# Patient Record
Sex: MALE | Race: WHITE | Employment: UNEMPLOYED | URBAN - METROPOLITAN AREA
[De-identification: names, ages, dates, MRNs, and addresses within clinical notes are randomized per-mention and may not be internally consistent; named-entity substitution may affect disease eponyms.]

---
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
## 2021-05-15 ENCOUNTER — APPOINTMENT (OUTPATIENT)
Dept: CT IMAGING | Age: 21
DRG: 816 | End: 2021-05-15
Payer: MEDICAID

## 2021-05-15 ENCOUNTER — APPOINTMENT (OUTPATIENT)
Dept: GENERAL RADIOLOGY | Age: 21
DRG: 816 | End: 2021-05-15
Payer: MEDICAID

## 2021-05-15 ENCOUNTER — HOSPITAL ENCOUNTER (INPATIENT)
Age: 21
LOS: 1 days | Discharge: ANOTHER HOSPITAL | DRG: 816 | End: 2021-05-17
Attending: EMERGENCY MEDICINE | Admitting: STUDENT IN AN ORGANIZED HEALTH CARE EDUCATION/TRAINING PROGRAM
Payer: MEDICAID

## 2021-05-15 DIAGNOSIS — G93.40 ENCEPHALOPATHY: Primary | ICD-10-CM

## 2021-05-15 DIAGNOSIS — F12.90 MARIJUANA USE: ICD-10-CM

## 2021-05-15 DIAGNOSIS — R11.10 NON-INTRACTABLE VOMITING, PRESENCE OF NAUSEA NOT SPECIFIED, UNSPECIFIED VOMITING TYPE: ICD-10-CM

## 2021-05-15 DIAGNOSIS — F10.921 ACUTE ALCOHOLIC INTOXICATION WITH DELIRIUM (HCC): ICD-10-CM

## 2021-05-15 DIAGNOSIS — T68.XXXA HYPOTHERMIA, INITIAL ENCOUNTER: ICD-10-CM

## 2021-05-15 LAB
ALBUMIN SERPL-MCNC: 4.8 GM/DL (ref 3.4–5)
ALCOHOL SCREEN SERUM: 0.37 %WT/VOL
ALP BLD-CCNC: 96 IU/L (ref 40–129)
ALT SERPL-CCNC: 15 U/L (ref 10–40)
AMPHETAMINES: NEGATIVE
ANION GAP SERPL CALCULATED.3IONS-SCNC: 12 MMOL/L (ref 4–16)
APTT: 21.9 SECONDS (ref 25.1–37.1)
AST SERPL-CCNC: 14 IU/L (ref 15–37)
BARBITURATE SCREEN URINE: NEGATIVE
BASE EXCESS: 4 (ref 0–3.3)
BASOPHILS ABSOLUTE: 0.1 K/CU MM
BASOPHILS RELATIVE PERCENT: 0.5 % (ref 0–1)
BENZODIAZEPINE SCREEN, URINE: NEGATIVE
BILIRUB SERPL-MCNC: 0.3 MG/DL (ref 0–1)
BILIRUBIN DIRECT: 0.2 MG/DL (ref 0–0.3)
BILIRUBIN, INDIRECT: 0.1 MG/DL (ref 0–0.7)
BUN BLDV-MCNC: 15 MG/DL (ref 6–23)
CALCIUM SERPL-MCNC: 9.4 MG/DL (ref 8.3–10.6)
CANNABINOID SCREEN URINE: ABNORMAL
CHLORIDE BLD-SCNC: 111 MMOL/L (ref 99–110)
CO2: 25 MMOL/L (ref 21–32)
COCAINE METABOLITE: NEGATIVE
COMMENT: ABNORMAL
CREAT SERPL-MCNC: 1.3 MG/DL (ref 0.9–1.3)
DIFFERENTIAL TYPE: ABNORMAL
EOSINOPHILS ABSOLUTE: 0.3 K/CU MM
EOSINOPHILS RELATIVE PERCENT: 2.5 % (ref 0–3)
GFR AFRICAN AMERICAN: >60 ML/MIN/1.73M2
GFR NON-AFRICAN AMERICAN: >60 ML/MIN/1.73M2
GLUCOSE BLD-MCNC: 136 MG/DL (ref 70–99)
GLUCOSE BLD-MCNC: 163 MG/DL (ref 70–99)
HCO3 VENOUS: 24.2 MMOL/L (ref 19–25)
HCT VFR BLD CALC: 47.3 % (ref 42–52)
HEMOGLOBIN: 16.2 GM/DL (ref 13.5–18)
IMMATURE NEUTROPHIL %: 1.6 % (ref 0–0.43)
INR BLD: 0.99 INDEX
LIPASE: 15 IU/L (ref 13–60)
LYMPHOCYTES ABSOLUTE: 2.5 K/CU MM
LYMPHOCYTES RELATIVE PERCENT: 18.5 % (ref 24–44)
MCH RBC QN AUTO: 30.6 PG (ref 27–31)
MCHC RBC AUTO-ENTMCNC: 34.2 % (ref 32–36)
MCV RBC AUTO: 89.4 FL (ref 78–100)
MONOCYTES ABSOLUTE: 0.7 K/CU MM
MONOCYTES RELATIVE PERCENT: 5.5 % (ref 0–4)
NUCLEATED RBC %: 0 %
O2 SAT, VEN: 64.4 % (ref 50–70)
OPIATES, URINE: NEGATIVE
OXYCODONE: NEGATIVE
PCO2, VEN: 54 MMHG (ref 38–52)
PDW BLD-RTO: 12.6 % (ref 11.7–14.9)
PH VENOUS: 7.26 (ref 7.32–7.42)
PHENCYCLIDINE, URINE: NEGATIVE
PLATELET # BLD: 238 K/CU MM (ref 140–440)
PMV BLD AUTO: 11.2 FL (ref 7.5–11.1)
PO2, VEN: 39 MMHG (ref 28–48)
POTASSIUM SERPL-SCNC: 3.4 MMOL/L (ref 3.5–5.1)
PROTHROMBIN TIME: 12 SECONDS (ref 11.7–14.5)
RBC # BLD: 5.29 M/CU MM (ref 4.6–6.2)
SEGMENTED NEUTROPHILS ABSOLUTE COUNT: 9.4 K/CU MM
SEGMENTED NEUTROPHILS RELATIVE PERCENT: 71.4 % (ref 36–66)
SODIUM BLD-SCNC: 148 MMOL/L (ref 135–145)
TOTAL IMMATURE NEUTOROPHIL: 0.21 K/CU MM
TOTAL NUCLEATED RBC: 0 K/CU MM
TOTAL PROTEIN: 7.2 GM/DL (ref 6.4–8.2)
WBC # BLD: 13.2 K/CU MM (ref 4–10.5)

## 2021-05-15 PROCEDURE — 2700000000 HC OXYGEN THERAPY PER DAY

## 2021-05-15 PROCEDURE — 5A1945Z RESPIRATORY VENTILATION, 24-96 CONSECUTIVE HOURS: ICD-10-PCS | Performed by: EMERGENCY MEDICINE

## 2021-05-15 PROCEDURE — 72125 CT NECK SPINE W/O DYE: CPT

## 2021-05-15 PROCEDURE — 2500000003 HC RX 250 WO HCPCS

## 2021-05-15 PROCEDURE — 99285 EMERGENCY DEPT VISIT HI MDM: CPT

## 2021-05-15 PROCEDURE — 82248 BILIRUBIN DIRECT: CPT

## 2021-05-15 PROCEDURE — 85730 THROMBOPLASTIN TIME PARTIAL: CPT

## 2021-05-15 PROCEDURE — 71045 X-RAY EXAM CHEST 1 VIEW: CPT

## 2021-05-15 PROCEDURE — 0BH18EZ INSERTION OF ENDOTRACHEAL AIRWAY INTO TRACHEA, VIA NATURAL OR ARTIFICIAL OPENING ENDOSCOPIC: ICD-10-PCS | Performed by: EMERGENCY MEDICINE

## 2021-05-15 PROCEDURE — 85610 PROTHROMBIN TIME: CPT

## 2021-05-15 PROCEDURE — 31500 INSERT EMERGENCY AIRWAY: CPT

## 2021-05-15 PROCEDURE — 83690 ASSAY OF LIPASE: CPT

## 2021-05-15 PROCEDURE — 96374 THER/PROPH/DIAG INJ IV PUSH: CPT

## 2021-05-15 PROCEDURE — 2500000003 HC RX 250 WO HCPCS: Performed by: EMERGENCY MEDICINE

## 2021-05-15 PROCEDURE — 74018 RADEX ABDOMEN 1 VIEW: CPT

## 2021-05-15 PROCEDURE — 82962 GLUCOSE BLOOD TEST: CPT

## 2021-05-15 PROCEDURE — 96375 TX/PRO/DX INJ NEW DRUG ADDON: CPT

## 2021-05-15 PROCEDURE — 80307 DRUG TEST PRSMV CHEM ANLYZR: CPT

## 2021-05-15 PROCEDURE — 80053 COMPREHEN METABOLIC PANEL: CPT

## 2021-05-15 PROCEDURE — 82805 BLOOD GASES W/O2 SATURATION: CPT

## 2021-05-15 PROCEDURE — 93005 ELECTROCARDIOGRAM TRACING: CPT | Performed by: EMERGENCY MEDICINE

## 2021-05-15 PROCEDURE — 85025 COMPLETE CBC W/AUTO DIFF WBC: CPT

## 2021-05-15 PROCEDURE — G0480 DRUG TEST DEF 1-7 CLASSES: HCPCS

## 2021-05-15 PROCEDURE — 70450 CT HEAD/BRAIN W/O DYE: CPT

## 2021-05-15 RX ORDER — ROCURONIUM BROMIDE 10 MG/ML
INJECTION, SOLUTION INTRAVENOUS DAILY PRN
Status: DISCONTINUED | OUTPATIENT
Start: 2021-05-15 | End: 2021-05-17 | Stop reason: HOSPADM

## 2021-05-15 RX ORDER — ETOMIDATE 2 MG/ML
INJECTION INTRAVENOUS DAILY PRN
Status: DISCONTINUED | OUTPATIENT
Start: 2021-05-15 | End: 2021-05-17 | Stop reason: HOSPADM

## 2021-05-15 RX ADMIN — ETOMIDATE 20 MG: 2 INJECTION, SOLUTION INTRAVENOUS at 23:04

## 2021-05-15 RX ADMIN — ROCURONIUM BROMIDE 100 MG: 10 SOLUTION INTRAVENOUS at 23:04

## 2021-05-15 ASSESSMENT — PULMONARY FUNCTION TESTS: PIF_VALUE: 25

## 2021-05-16 ENCOUNTER — APPOINTMENT (OUTPATIENT)
Dept: GENERAL RADIOLOGY | Age: 21
DRG: 816 | End: 2021-05-16
Payer: MEDICAID

## 2021-05-16 PROBLEM — T51.0X1A: Status: ACTIVE | Noted: 2021-05-16

## 2021-05-16 LAB
ANION GAP SERPL CALCULATED.3IONS-SCNC: 12 MMOL/L (ref 4–16)
BASE EXCESS: 4 (ref 0–3.3)
BASE EXCESS: 4 (ref 0–3.3)
BUN BLDV-MCNC: 12 MG/DL (ref 6–23)
CALCIUM SERPL-MCNC: 8 MG/DL (ref 8.3–10.6)
CARBON MONOXIDE, BLOOD: 1.4 % (ref 0–5)
CARBON MONOXIDE, BLOOD: 1.6 % (ref 0–5)
CHLORIDE BLD-SCNC: 110 MMOL/L (ref 99–110)
CO2 CONTENT: 23.6 MMOL/L (ref 19–24)
CO2 CONTENT: 24.1 MMOL/L (ref 19–24)
CO2: 22 MMOL/L (ref 21–32)
COMMENT: ABNORMAL
COMMENT: ABNORMAL
CREAT SERPL-MCNC: 1 MG/DL (ref 0.9–1.3)
EKG ATRIAL RATE: 40 BPM
EKG DIAGNOSIS: NORMAL
EKG P AXIS: 62 DEGREES
EKG P-R INTERVAL: 164 MS
EKG Q-T INTERVAL: 478 MS
EKG QRS DURATION: 104 MS
EKG QTC CALCULATION (BAZETT): 389 MS
EKG R AXIS: 5 DEGREES
EKG T AXIS: 31 DEGREES
EKG VENTRICULAR RATE: 40 BPM
GFR AFRICAN AMERICAN: >60 ML/MIN/1.73M2
GFR NON-AFRICAN AMERICAN: >60 ML/MIN/1.73M2
GLUCOSE BLD-MCNC: 118 MG/DL (ref 70–99)
HCO3 ARTERIAL: 22.2 MMOL/L (ref 18–23)
HCO3 ARTERIAL: 22.6 MMOL/L (ref 18–23)
MAGNESIUM: 1.8 MG/DL (ref 1.8–2.4)
METHEMOGLOBIN ARTERIAL: 1.3 %
METHEMOGLOBIN ARTERIAL: 1.5 %
O2 SATURATION: 95.5 % (ref 96–97)
O2 SATURATION: 95.8 % (ref 96–97)
PCO2 ARTERIAL: 44 MMHG (ref 32–45)
PCO2 ARTERIAL: 48 MMHG (ref 32–45)
PH BLOOD: 7.28 (ref 7.34–7.45)
PH BLOOD: 7.31 (ref 7.34–7.45)
PHOSPHORUS: 4.2 MG/DL (ref 2.5–4.9)
PO2 ARTERIAL: 140 MMHG (ref 75–100)
PO2 ARTERIAL: 96 MMHG (ref 75–100)
POTASSIUM SERPL-SCNC: 3.8 MMOL/L (ref 3.5–5.1)
SODIUM BLD-SCNC: 144 MMOL/L (ref 135–145)
TROPONIN T: <0.01 NG/ML
TROPONIN T: <0.01 NG/ML

## 2021-05-16 PROCEDURE — 36600 WITHDRAWAL OF ARTERIAL BLOOD: CPT

## 2021-05-16 PROCEDURE — 2500000003 HC RX 250 WO HCPCS: Performed by: INTERNAL MEDICINE

## 2021-05-16 PROCEDURE — 94761 N-INVAS EAR/PLS OXIMETRY MLT: CPT

## 2021-05-16 PROCEDURE — 6370000000 HC RX 637 (ALT 250 FOR IP): Performed by: INTERNAL MEDICINE

## 2021-05-16 PROCEDURE — 6360000002 HC RX W HCPCS: Performed by: STUDENT IN AN ORGANIZED HEALTH CARE EDUCATION/TRAINING PROGRAM

## 2021-05-16 PROCEDURE — 80048 BASIC METABOLIC PNL TOTAL CA: CPT

## 2021-05-16 PROCEDURE — 2500000003 HC RX 250 WO HCPCS: Performed by: EMERGENCY MEDICINE

## 2021-05-16 PROCEDURE — 94002 VENT MGMT INPAT INIT DAY: CPT

## 2021-05-16 PROCEDURE — 2500000003 HC RX 250 WO HCPCS: Performed by: STUDENT IN AN ORGANIZED HEALTH CARE EDUCATION/TRAINING PROGRAM

## 2021-05-16 PROCEDURE — 94640 AIRWAY INHALATION TREATMENT: CPT

## 2021-05-16 PROCEDURE — 84484 ASSAY OF TROPONIN QUANT: CPT

## 2021-05-16 PROCEDURE — 89220 SPUTUM SPECIMEN COLLECTION: CPT

## 2021-05-16 PROCEDURE — 2000000000 HC ICU R&B

## 2021-05-16 PROCEDURE — 82803 BLOOD GASES ANY COMBINATION: CPT

## 2021-05-16 PROCEDURE — 2700000000 HC OXYGEN THERAPY PER DAY

## 2021-05-16 PROCEDURE — 76937 US GUIDE VASCULAR ACCESS: CPT

## 2021-05-16 PROCEDURE — 6360000002 HC RX W HCPCS: Performed by: EMERGENCY MEDICINE

## 2021-05-16 PROCEDURE — C1751 CATH, INF, PER/CENT/MIDLINE: HCPCS

## 2021-05-16 PROCEDURE — 02HV33Z INSERTION OF INFUSION DEVICE INTO SUPERIOR VENA CAVA, PERCUTANEOUS APPROACH: ICD-10-PCS | Performed by: STUDENT IN AN ORGANIZED HEALTH CARE EDUCATION/TRAINING PROGRAM

## 2021-05-16 PROCEDURE — 99223 1ST HOSP IP/OBS HIGH 75: CPT | Performed by: INTERNAL MEDICINE

## 2021-05-16 PROCEDURE — 36569 INSJ PICC 5 YR+ W/O IMAGING: CPT

## 2021-05-16 PROCEDURE — 71045 X-RAY EXAM CHEST 1 VIEW: CPT

## 2021-05-16 PROCEDURE — 93010 ELECTROCARDIOGRAM REPORT: CPT | Performed by: INTERNAL MEDICINE

## 2021-05-16 PROCEDURE — 2580000003 HC RX 258: Performed by: EMERGENCY MEDICINE

## 2021-05-16 PROCEDURE — 83735 ASSAY OF MAGNESIUM: CPT

## 2021-05-16 PROCEDURE — 84100 ASSAY OF PHOSPHORUS: CPT

## 2021-05-16 PROCEDURE — 2580000003 HC RX 258: Performed by: STUDENT IN AN ORGANIZED HEALTH CARE EDUCATION/TRAINING PROGRAM

## 2021-05-16 RX ORDER — SODIUM CHLORIDE, SODIUM LACTATE, POTASSIUM CHLORIDE, CALCIUM CHLORIDE 600; 310; 30; 20 MG/100ML; MG/100ML; MG/100ML; MG/100ML
INJECTION, SOLUTION INTRAVENOUS CONTINUOUS
Status: DISCONTINUED | OUTPATIENT
Start: 2021-05-16 | End: 2021-05-17 | Stop reason: HOSPADM

## 2021-05-16 RX ORDER — PROPOFOL 10 MG/ML
5-50 INJECTION, EMULSION INTRAVENOUS
Status: DISCONTINUED | OUTPATIENT
Start: 2021-05-16 | End: 2021-05-17 | Stop reason: HOSPADM

## 2021-05-16 RX ORDER — LORAZEPAM 1 MG/1
1 TABLET ORAL
Status: DISCONTINUED | OUTPATIENT
Start: 2021-05-16 | End: 2021-05-17 | Stop reason: HOSPADM

## 2021-05-16 RX ORDER — LORAZEPAM 1 MG/1
2 TABLET ORAL
Status: DISCONTINUED | OUTPATIENT
Start: 2021-05-16 | End: 2021-05-17 | Stop reason: HOSPADM

## 2021-05-16 RX ORDER — PROMETHAZINE HYDROCHLORIDE 25 MG/1
12.5 TABLET ORAL EVERY 6 HOURS PRN
Status: DISCONTINUED | OUTPATIENT
Start: 2021-05-16 | End: 2021-05-17 | Stop reason: HOSPADM

## 2021-05-16 RX ORDER — LORAZEPAM 2 MG/ML
3 INJECTION INTRAMUSCULAR
Status: DISCONTINUED | OUTPATIENT
Start: 2021-05-16 | End: 2021-05-17 | Stop reason: HOSPADM

## 2021-05-16 RX ORDER — ALBUTEROL SULFATE 90 UG/1
4 AEROSOL, METERED RESPIRATORY (INHALATION) EVERY 4 HOURS
Status: DISCONTINUED | OUTPATIENT
Start: 2021-05-16 | End: 2021-05-17 | Stop reason: HOSPADM

## 2021-05-16 RX ORDER — KETAMINE HYDROCHLORIDE 10 MG/ML
100 INJECTION, SOLUTION INTRAMUSCULAR; INTRAVENOUS ONCE
Status: COMPLETED | OUTPATIENT
Start: 2021-05-16 | End: 2021-05-16

## 2021-05-16 RX ORDER — SODIUM CHLORIDE 0.9 % (FLUSH) 0.9 %
5-40 SYRINGE (ML) INJECTION PRN
Status: DISCONTINUED | OUTPATIENT
Start: 2021-05-16 | End: 2021-05-17 | Stop reason: HOSPADM

## 2021-05-16 RX ORDER — LORAZEPAM 2 MG/ML
1 INJECTION INTRAMUSCULAR
Status: DISCONTINUED | OUTPATIENT
Start: 2021-05-16 | End: 2021-05-17 | Stop reason: HOSPADM

## 2021-05-16 RX ORDER — LORAZEPAM 2 MG/ML
2 INJECTION INTRAMUSCULAR
Status: DISCONTINUED | OUTPATIENT
Start: 2021-05-16 | End: 2021-05-17 | Stop reason: HOSPADM

## 2021-05-16 RX ORDER — SODIUM CHLORIDE 9 MG/ML
25 INJECTION, SOLUTION INTRAVENOUS PRN
Status: DISCONTINUED | OUTPATIENT
Start: 2021-05-16 | End: 2021-05-17 | Stop reason: HOSPADM

## 2021-05-16 RX ORDER — MIDAZOLAM HYDROCHLORIDE 2 MG/2ML
4 INJECTION, SOLUTION INTRAMUSCULAR; INTRAVENOUS EVERY 4 HOURS PRN
Status: DISCONTINUED | OUTPATIENT
Start: 2021-05-16 | End: 2021-05-17 | Stop reason: HOSPADM

## 2021-05-16 RX ORDER — LORAZEPAM 2 MG/ML
4 INJECTION INTRAMUSCULAR
Status: DISCONTINUED | OUTPATIENT
Start: 2021-05-16 | End: 2021-05-17 | Stop reason: HOSPADM

## 2021-05-16 RX ORDER — SODIUM CHLORIDE 0.9 % (FLUSH) 0.9 %
5-40 SYRINGE (ML) INJECTION EVERY 12 HOURS SCHEDULED
Status: DISCONTINUED | OUTPATIENT
Start: 2021-05-16 | End: 2021-05-17 | Stop reason: HOSPADM

## 2021-05-16 RX ORDER — LORAZEPAM 1 MG/1
3 TABLET ORAL
Status: DISCONTINUED | OUTPATIENT
Start: 2021-05-16 | End: 2021-05-17 | Stop reason: HOSPADM

## 2021-05-16 RX ORDER — POLYETHYLENE GLYCOL 3350 17 G/17G
17 POWDER, FOR SOLUTION ORAL DAILY PRN
Status: DISCONTINUED | OUTPATIENT
Start: 2021-05-16 | End: 2021-05-17 | Stop reason: HOSPADM

## 2021-05-16 RX ORDER — LORAZEPAM 1 MG/1
4 TABLET ORAL
Status: DISCONTINUED | OUTPATIENT
Start: 2021-05-16 | End: 2021-05-17 | Stop reason: HOSPADM

## 2021-05-16 RX ORDER — 0.9 % SODIUM CHLORIDE 0.9 %
1000 INTRAVENOUS SOLUTION INTRAVENOUS ONCE
Status: COMPLETED | OUTPATIENT
Start: 2021-05-16 | End: 2021-05-16

## 2021-05-16 RX ORDER — THIAMINE HYDROCHLORIDE 100 MG/ML
100 INJECTION, SOLUTION INTRAMUSCULAR; INTRAVENOUS DAILY
Status: DISCONTINUED | OUTPATIENT
Start: 2021-05-16 | End: 2021-05-17 | Stop reason: HOSPADM

## 2021-05-16 RX ORDER — ACETAMINOPHEN 325 MG/1
650 TABLET ORAL EVERY 6 HOURS PRN
Status: DISCONTINUED | OUTPATIENT
Start: 2021-05-16 | End: 2021-05-17 | Stop reason: HOSPADM

## 2021-05-16 RX ORDER — ACETAMINOPHEN 650 MG/1
650 SUPPOSITORY RECTAL EVERY 6 HOURS PRN
Status: DISCONTINUED | OUTPATIENT
Start: 2021-05-16 | End: 2021-05-17 | Stop reason: HOSPADM

## 2021-05-16 RX ORDER — CHLORHEXIDINE GLUCONATE 0.12 MG/ML
15 RINSE ORAL 2 TIMES DAILY
Status: DISCONTINUED | OUTPATIENT
Start: 2021-05-16 | End: 2021-05-17 | Stop reason: HOSPADM

## 2021-05-16 RX ORDER — POTASSIUM CHLORIDE 7.45 MG/ML
10 INJECTION INTRAVENOUS PRN
Status: DISCONTINUED | OUTPATIENT
Start: 2021-05-16 | End: 2021-05-17 | Stop reason: HOSPADM

## 2021-05-16 RX ADMIN — PROPOFOL 40 MCG/KG/MIN: 10 INJECTION, EMULSION INTRAVENOUS at 04:54

## 2021-05-16 RX ADMIN — ENOXAPARIN SODIUM 40 MG: 40 INJECTION SUBCUTANEOUS at 08:23

## 2021-05-16 RX ADMIN — PROPOFOL 65 MCG/KG/MIN: 10 INJECTION, EMULSION INTRAVENOUS at 17:13

## 2021-05-16 RX ADMIN — CHLORHEXIDINE GLUCONATE 0.12% ORAL RINSE 15 ML: 1.2 LIQUID ORAL at 08:26

## 2021-05-16 RX ADMIN — FAMOTIDINE 20 MG: 10 INJECTION, SOLUTION INTRAVENOUS at 21:04

## 2021-05-16 RX ADMIN — LIDOCAINE HYDROCHLORIDE 5 ML: 10 INJECTION, SOLUTION EPIDURAL; INFILTRATION; INTRACAUDAL; PERINEURAL at 10:55

## 2021-05-16 RX ADMIN — PROPOFOL 80 MCG/KG/MIN: 10 INJECTION, EMULSION INTRAVENOUS at 21:11

## 2021-05-16 RX ADMIN — Medication 4 PUFF: at 15:18

## 2021-05-16 RX ADMIN — PROPOFOL 65 MCG/KG/MIN: 10 INJECTION, EMULSION INTRAVENOUS at 14:41

## 2021-05-16 RX ADMIN — SODIUM CHLORIDE 1000 ML: 9 INJECTION, SOLUTION INTRAVENOUS at 01:15

## 2021-05-16 RX ADMIN — FAMOTIDINE 20 MG: 10 INJECTION, SOLUTION INTRAVENOUS at 03:00

## 2021-05-16 RX ADMIN — FAMOTIDINE 20 MG: 10 INJECTION, SOLUTION INTRAVENOUS at 08:26

## 2021-05-16 RX ADMIN — PROPOFOL 80 MCG/KG/MIN: 10 INJECTION, EMULSION INTRAVENOUS at 23:20

## 2021-05-16 RX ADMIN — KETAMINE HYDROCHLORIDE 100 MG: 10 INJECTION INTRAMUSCULAR; INTRAVENOUS at 00:32

## 2021-05-16 RX ADMIN — SODIUM CHLORIDE, POTASSIUM CHLORIDE, SODIUM LACTATE AND CALCIUM CHLORIDE: 600; 310; 30; 20 INJECTION, SOLUTION INTRAVENOUS at 02:45

## 2021-05-16 RX ADMIN — Medication 2 MCG/MIN: at 04:06

## 2021-05-16 RX ADMIN — SODIUM CHLORIDE, POTASSIUM CHLORIDE, SODIUM LACTATE AND CALCIUM CHLORIDE: 600; 310; 30; 20 INJECTION, SOLUTION INTRAVENOUS at 11:24

## 2021-05-16 RX ADMIN — PROPOFOL 10 MCG/KG/MIN: 10 INJECTION, EMULSION INTRAVENOUS at 00:08

## 2021-05-16 RX ADMIN — PROPOFOL 65 MCG/KG/MIN: 10 INJECTION, EMULSION INTRAVENOUS at 07:19

## 2021-05-16 RX ADMIN — LORAZEPAM 4 MG: 2 INJECTION INTRAMUSCULAR; INTRAVENOUS at 22:06

## 2021-05-16 RX ADMIN — Medication 50 MCG/HR: at 18:26

## 2021-05-16 RX ADMIN — Medication 4 PUFF: at 19:44

## 2021-05-16 RX ADMIN — CHLORHEXIDINE GLUCONATE 0.12% ORAL RINSE 15 ML: 1.2 LIQUID ORAL at 21:05

## 2021-05-16 RX ADMIN — SODIUM CHLORIDE, PRESERVATIVE FREE 10 ML: 5 INJECTION INTRAVENOUS at 21:05

## 2021-05-16 RX ADMIN — PROPOFOL 65 MCG/KG/MIN: 10 INJECTION, EMULSION INTRAVENOUS at 11:24

## 2021-05-16 RX ADMIN — SODIUM CHLORIDE, PRESERVATIVE FREE 10 ML: 5 INJECTION INTRAVENOUS at 08:24

## 2021-05-16 RX ADMIN — SODIUM CHLORIDE, POTASSIUM CHLORIDE, SODIUM LACTATE AND CALCIUM CHLORIDE: 600; 310; 30; 20 INJECTION, SOLUTION INTRAVENOUS at 19:22

## 2021-05-16 RX ADMIN — PROPOFOL 65 MCG/KG/MIN: 10 INJECTION, EMULSION INTRAVENOUS at 19:22

## 2021-05-16 RX ADMIN — KETAMINE HYDROCHLORIDE 100 MG: 10 INJECTION INTRAMUSCULAR; INTRAVENOUS at 01:49

## 2021-05-16 RX ADMIN — Medication 25 MCG/HR: at 05:50

## 2021-05-16 RX ADMIN — THIAMINE HYDROCHLORIDE 100 MG: 100 INJECTION, SOLUTION INTRAMUSCULAR; INTRAVENOUS at 08:23

## 2021-05-16 RX ADMIN — ALBUTEROL SULFATE 4 PUFF: 90 AEROSOL, METERED RESPIRATORY (INHALATION) at 19:44

## 2021-05-16 RX ADMIN — ALBUTEROL SULFATE 4 PUFF: 90 AEROSOL, METERED RESPIRATORY (INHALATION) at 15:17

## 2021-05-16 ASSESSMENT — PULMONARY FUNCTION TESTS
PIF_VALUE: 20
PIF_VALUE: 18
PIF_VALUE: 19
PIF_VALUE: 18
PIF_VALUE: 19
PIF_VALUE: 19
PIF_VALUE: 18
PIF_VALUE: 19

## 2021-05-16 ASSESSMENT — PAIN SCALES - GENERAL: PAINLEVEL_OUTOF10: 0

## 2021-05-16 NOTE — ED NOTES
Resp at bedside, RSI available verbal order for Dr. Parrish Parker 20mg etomidate, 100mg  Rustam     Shira Eugene, [address] [place] [address]  05/15/21 6892

## 2021-05-16 NOTE — ED NOTES
Pt unresponsiveness, Dr. Dania Anderson to bedside, plan to intubate to maintain airway.       Laly Pedersen RN  05/15/21 7096

## 2021-05-16 NOTE — PROGRESS NOTES
Pt intubated by  with out complication and placed on vent with initial settings AC RR 12  50% o2 PEEP 5. Will monitor/.
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
## 2021-05-16 NOTE — ED NOTES
[facility]  05/15/21 4890

## 2021-05-16 NOTE — ED NOTES
Report called to Jasmin Schroeder RN for bed 2121     Courtney Costello, [facility]  05/16/21 9745

## 2021-05-16 NOTE — ED NOTES
Dr. Manjinder Zepeda notified of temp 84.6 verbal order for bear hugger warming. Attempted to locate bear hugger machine without success. House supervisor and ICU were called to try and locate after searching ED.      Courtney Costello RN  05/16/21 5080
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
## 2021-05-16 NOTE — ED NOTES
Pt bradycardic episode HR 36-44, Dr Hu Cristina notified. EKG completed.              Kali Zhou RN  05/15/21 8459

## 2021-05-16 NOTE — CONSULTS
Consult completed. .Education regarding insertion of PICC reviewed with patient's father. Risk/benefit/and alternatives discussed. verbalized understanding. Consent given by Phong Hager, patient's father . Time out done at 0921. PICC line inserted right upper arm  without difficulty per protocol. Pt tolerated well. Good blood return and flushes easily. Doppler and/or ECG tip confirmation system utilized for tip placement with P wave changes noted by RN during insertion and ECG strip/Doppler signature left on chart. Educational booklet left in chart.  PICC ok to use, bedside RN notified of same   Hannah Wilson RN
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
## 2021-05-16 NOTE — PROGRESS NOTES
05/16/21 0218   Vent Information   $Ventilation $Initial Day   Skin Assessment Clean, dry, & intact   Suction Catheter Diameter 14   Vent Type 980   Vent Mode AC/VC   Vt Ordered 500 mL   Rate Set 12 bmp   Peak Flow 55 L/min   Pressure Support 0 cmH20   FiO2  30 %   SpO2 100 %   SpO2/FiO2 ratio 333.33   Sensitivity 3   PEEP/CPAP 5   I Time/ I Time % 0 s   Humidification Source HME   Vent Patient Data   High Peep/I Pressure 0   Peak Inspiratory Pressure 18 cmH2O   Mean Airway Pressure 7.5 cmH20   Rate Measured 12 br/min   Vt Exhaled 505 mL   Minute Volume 6.04 Liters   I:E Ratio 1:4   Plateau Pressure 13 UBD46   Static Compliance 59 mL/cmH2O   Cough/Sputum   Sputum How Obtained Endotracheal;Suctioned   $Obtained Sample $Induced Sputum   Cough Non-productive   Sputum Amount None   Sputum Color None   Tenacity None   Spontaneous Breathing Trial (SBT) RT Doc   Pulse 86   Additional Respiratory  Assessments   Resp 14   Alarm Settings   High Pressure Alarm 40 cmH2O   Delay Alarm 20 sec(s)   Low Minute Volume Alarm 2.5 L/min   Apnea (secs) 20 secs   High Respiratory Rate 40 br/min   Low Exhaled Vt  250 mL   Non-Surgical Airway Endo Tracheal Tube   Placement Date/Time: 05/15/21 7803   Timeout: Procedure; Patient;Site/Side;Appropriate Equipment; Consent Confirmed;Site prepped with chlorhexidine;Correct Position  Mask Ventilation: Ventilated by mask (1)  Placed By: In ED  Inserted by: Dr. Jasmyn Vazquez. ..    Secured at 26 cm   Measured From [facility] By [employer] tube kaba   Site Condition Dry   Cuff Pressure   (Minimal leak)   Pt intubated and brought from the ED

## 2021-05-16 NOTE — CARE COORDINATION
Pt is currently intubated and sedated. CM placed a list of addiction support options in the pt's discharge instructions. CM will continue to follow and be available during admission.     NAEL Thurston

## 2021-05-16 NOTE — CONSULTS
Pulmonary Consult Note      Reason for Consult: vent management  Requesting Physician: Dr. Willow Sanchez  Subjective:   CHIEF COMPLAINT :    Patient Active Problem List    Diagnosis Date Noted    [facility] 05/16/2021        HPI:                The patient is a 21 y.o. male with significant past medical history of Asthma  presents with complaints of suspected Alcohol overdose and ? Poisoning. His Alcohol level was 0.37. He was at his friend house drinking 2 5th of liquor in 1 to 2 hours. He was found lying on the bed. He was intubated and brought to the ICU. At this time he is sedated with minimal response to the painful stimuli. José Luis recently had a break up with his GF about 2 weeks ago. Past Medical History:      Diagnosis Date    Asthma       Past Surgical History:    History reviewed. No pertinent surgical history. Current Medications:     sodium chloride flush  5-40 mL Intravenous 2 times per day    enoxaparin  40 mg Subcutaneous Daily    thiamine  100 mg Intravenous Daily    sodium chloride flush  5-40 mL Intravenous 2 times per day    albuterol sulfate HFA  4 puff Inhalation Q4H    And    ipratropium  4 puff Inhalation Q4H    chlorhexidine  15 mL Mouth/Throat BID    famotidine (PEPCID) injection  20 mg Intravenous BID     Allergies:    Social History:    TOBACCO:   reports that he has never smoked. He does not have any smokeless tobacco history on file. ETOH:   reports no history of alcohol use. Patient currently lives independently    Family History:   History reviewed. No pertinent family history. REVIEW OF SYSTEMS:    CONSTITUTIONAL:  negative for fevers, chills, diaphoresis, activity change, appetite change, fatigue, night sweats and unexpected weight change.    EYES:  negative for blurred vision, eye discharge, visual disturbance and icterus  HEENT:  negative for hearing loss, tinnitus, ear drainage, sinus pressure, nasal congestion, epistaxis time. No obvious depression or anxiety. MUSCULOSKELETAL: No obvious misalignment or effusion of the joints. No clubbing, cyanosis of the digits. SKIN:  normal skin color, texture, turgor and no redness, warmth, or swelling. No palpable nodules    DATA:    Old records have been reviewed  CBC with Differential:    Lab Results   Component Value Date    WBC 13.2 05/15/2021    RBC 5.29 05/15/2021    HGB 16.2 05/15/2021    HCT 47.3 05/15/2021     05/15/2021    MCV 89.4 05/15/2021    MCH 30.6 05/15/2021    MCHC 34.2 05/15/2021    RDW 12.6 05/15/2021    SEGSPCT 71.4 05/15/2021    LYMPHOPCT 18.5 05/15/2021    MONOPCT 5.5 05/15/2021    BASOPCT 0.5 05/15/2021    MONOSABS 0.7 05/15/2021    LYMPHSABS 2.5 05/15/2021    EOSABS 0.3 05/15/2021    BASOSABS 0.1 05/15/2021    DIFFTYPE AUTOMATED DIFFERENTIAL 05/15/2021     BMP:    Lab Results   Component Value Date     05/16/2021    K 3.8 05/16/2021     05/16/2021    CO2 22 05/16/2021    BUN 12 05/16/2021    CREATININE 1.0 05/16/2021    CALCIUM 8.0 05/16/2021    GFRAA >60 05/16/2021    LABGLOM >60 05/16/2021    GLUCOSE 118 05/16/2021     Hepatic Function Panel:    Lab Results   Component Value Date    ALKPHOS 96 05/15/2021    ALT 15 05/15/2021    AST 14 05/15/2021    PROT 7.2 05/15/2021    BILITOT 0.3 05/15/2021    BILIDIR 0.2 05/15/2021    IBILI 0.1 05/15/2021     ABG:    Lab Results   Component Value Date    JXE6WNX 22.2 05/16/2021    NEV4HCD 44.0 05/16/2021    PO2ART 96 05/16/2021       Cultures:   Pending      Radiology Review:      No acute cardiopulmonary process    Assessment/Plan       Patient Active Problem List    Diagnosis Date Noted    Accidental ETOH poisoning, initial encounter ([facility] 05/16/2021     Alcohol ? Overdose  Cannabinoids positive  Hypomagnesemia  Acute hypoxic resp failure  VDRF    PLAN  1. Watch for withdrawal symptoms  2. Propofol  3. Thiamine  4. Folic acid  5. MVT  6. Ativan prn  7. Inhalers  8. Keep sats > 92%  9.  DVT and GI Prophylaxis  10. C/w present management  11. CXR in am  15.  SAT and SBT trial when stable      Electronically signed by Rufino Colorado MD on 5/16/2021 at 12:09 PM

## 2021-05-16 NOTE — PROGRESS NOTES
Attempting to draw labs at this time pt sat up in bed began kicking and fighting vent, HR went up to 140 O2 dropped to low 80's. Increased Propofol pr MAR and started fentanyl gtt pr MAR order.

## 2021-05-16 NOTE — PROGRESS NOTES
30-year-old male admitted for alcohol intoxication.   Patient was found unresponsive, currently intubated  History of alcohol abuse    Labs reviewed  Continue present management, supportive management  Watch out for alcohol withdrawal  Pulmonology on board for vent management

## 2021-05-17 ENCOUNTER — APPOINTMENT (OUTPATIENT)
Dept: GENERAL RADIOLOGY | Age: 21
DRG: 816 | End: 2021-05-17
Payer: MEDICAID

## 2021-05-17 VITALS
OXYGEN SATURATION: 95 % | SYSTOLIC BLOOD PRESSURE: 122 MMHG | HEIGHT: 68 IN | WEIGHT: 230 LBS | DIASTOLIC BLOOD PRESSURE: 76 MMHG | RESPIRATION RATE: 16 BRPM | HEART RATE: 93 BPM | TEMPERATURE: 98.2 F | BODY MASS INDEX: 34.86 KG/M2

## 2021-05-17 PROCEDURE — 94640 AIRWAY INHALATION TREATMENT: CPT

## 2021-05-17 PROCEDURE — 2580000003 HC RX 258: Performed by: INTERNAL MEDICINE

## 2021-05-17 PROCEDURE — 2580000003 HC RX 258: Performed by: NURSE PRACTITIONER

## 2021-05-17 PROCEDURE — 6360000002 HC RX W HCPCS: Performed by: NURSE PRACTITIONER

## 2021-05-17 PROCEDURE — 2500000003 HC RX 250 WO HCPCS: Performed by: INTERNAL MEDICINE

## 2021-05-17 PROCEDURE — 6360000002 HC RX W HCPCS: Performed by: INTERNAL MEDICINE

## 2021-05-17 PROCEDURE — 6360000002 HC RX W HCPCS: Performed by: STUDENT IN AN ORGANIZED HEALTH CARE EDUCATION/TRAINING PROGRAM

## 2021-05-17 RX ORDER — LORAZEPAM 2 MG/ML
4 INJECTION INTRAMUSCULAR ONCE
Status: COMPLETED | OUTPATIENT
Start: 2021-05-17 | End: 2021-05-17

## 2021-05-17 RX ADMIN — Medication 4 PUFF: at 00:56

## 2021-05-17 RX ADMIN — ALBUTEROL SULFATE 4 PUFF: 90 AEROSOL, METERED RESPIRATORY (INHALATION) at 00:55

## 2021-05-17 RX ADMIN — LORAZEPAM 4 MG: 2 INJECTION INTRAMUSCULAR; INTRAVENOUS at 00:30

## 2021-05-17 RX ADMIN — LORAZEPAM 4 MG: 2 INJECTION INTRAMUSCULAR; INTRAVENOUS at 01:07

## 2021-05-17 RX ADMIN — LEVETIRACETAM 1500 MG: 100 INJECTION, SOLUTION INTRAVENOUS at 00:57

## 2021-05-17 RX ADMIN — PROPOFOL 80 MCG/KG/MIN: 10 INJECTION, EMULSION INTRAVENOUS at 01:45

## 2021-05-17 RX ADMIN — MIDAZOLAM 1 MG/HR: 5 INJECTION INTRAMUSCULAR; INTRAVENOUS at 01:53

## 2021-05-17 ASSESSMENT — PULMONARY FUNCTION TESTS: PIF_VALUE: 19

## 2021-05-17 NOTE — PROGRESS NOTES
Pt currently maxed on propofol, fentanyl gtt at 125, contacted Dr Ana María Mccarty d/t pt sitting up in bed trying to fight restraints, gagging on ETT, HR elevation. Will try CIWA 4mg IV ativan if this does not help, verbal order for versed push placed.

## 2021-05-17 NOTE — PROGRESS NOTES
Report called to Aakash Blake at [facility], also called [facility] and gave him all the information for transfer. MICU ETA 40 min.

## 2021-05-17 NOTE — PLAN OF CARE
Problem: Non-Violent Restraints  Goal: Removal from restraints as soon as assessed to be safe  Outcome: Ongoing  Goal: No harm/injury to patient while restraints in use  Outcome: Ongoing  Goal: Patient's dignity will be maintained  Outcome: Ongoing     Problem: Falls - Risk of:  Goal: Will remain free from falls  Description: Will remain free from falls  Outcome: Ongoing  Goal: Absence of physical injury  Description: Absence of physical injury  Outcome: Ongoing     Problem: Skin Integrity:  Goal: Will show no infection signs and symptoms  Description: Will show no infection signs and symptoms  Outcome: Ongoing  Goal: Absence of new skin breakdown  Description: Absence of new skin breakdown  Outcome: Ongoing     Problem: Airway Clearance - Ineffective:  Goal: Ability to maintain a clear airway will improve  Description: Ability to maintain a clear airway will improve  Outcome: Ongoing

## 2021-05-17 NOTE — PLAN OF CARE
Patient witnessed by nurse to have 3 seizures, describes as clonic tonic, was given 8 mg IV Ativan, patient was already intubated, and vent dependent. Patient was maxed out on propofol, however was started on Versed drip as patient continued to have seizures. Neurology was consulted who recommended giving Keppra, and transferring to a facility that has continuous EEG.   Patient was transferred to [place].

## 2021-05-17 NOTE — PLAN OF CARE
Problem: Non-Violent Restraints  Goal: Removal from restraints as soon as assessed to be safe  5/17/2021 0326 by Lisa Cavazos RN  Outcome: Completed  5/16/2021 2145 by Lisa Cavazos RN  Outcome: Ongoing  Goal: No harm/injury to patient while restraints in use  5/17/2021 0326 by Lisa Cavazos RN  Outcome: Completed  5/16/2021 2145 by Lisa Cavazos RN  Outcome: Ongoing  Goal: Patient's dignity will be maintained  5/17/2021 0326 by Lisa Cavazos RN  Outcome: Completed  5/16/2021 2145 by Lisa Cavazos RN  Outcome: Ongoing     Problem: Falls - Risk of:  Goal: Will remain free from falls  Description: Will remain free from falls  5/17/2021 0326 by Lisa Cavazos RN  Outcome: Completed  5/16/2021 2145 by Lisa Cavazos RN  Outcome: Ongoing  Goal: Absence of physical injury  Description: Absence of physical injury  5/17/2021 0326 by Lisa Cavazos RN  Outcome: Completed  5/16/2021 2145 by Lisa Cavazos RN  Outcome: Ongoing     Problem: Skin Integrity:  Goal: Will show no infection signs and symptoms  Description: Will show no infection signs and symptoms  5/17/2021 0326 by Lisa Cavazos RN  Outcome: Completed  5/16/2021 2145 by Lisa Cavazos RN  Outcome: Ongoing  Goal: Absence of new skin breakdown  Description: Absence of new skin breakdown  5/17/2021 0326 by Lisa Cavazos RN  Outcome: Completed  5/16/2021 2145 by Lisa Cavazos RN  Outcome: Ongoing     Problem: Airway Clearance - Ineffective:  Goal: Ability to maintain a clear airway will improve  Description: Ability to maintain a clear airway will improve  5/17/2021 0326 by Lisa Cavazos RN  Outcome: Completed  5/16/2021 2145 by Lisa Cavazos RN  Outcome: Ongoing

## 2021-05-17 NOTE — DISCHARGE SUMMARY
Instructions [facility]:[id number]    Printed on:05/17/21 6388   Medication Information                      hydrocortisone 2.5 % cream  Apply topically 2 times daily. naproxen (NAPROSYN) 500 MG tablet  Take 1 tablet by mouth 2 times daily             predniSONE (DELTASONE) 10 MG tablet  5 tablets PO Qday X 3, then 4 tablets PO Qday X 3, then 3 tablets PO Qday X 3, then 2 tablets PO Qday X 3, then 1 tablets PO Qday X 3, then stop. BMP/CBC  Recent Labs     05/15/21  2300 05/16/21  0250   * 144   K 3.4* 3.8   * 110   CO2 25 22   BUN 15 12   CREATININE 1.3 1.0   WBC 13.2*  --    HCT 47.3  --      --        IMAGING:  XR ABDOMEN (KUB) (SINGLE AP VIEW)    Result Date: 5/16/2021  EXAMINATION: ONE SUPINE XRAY VIEW(S) OF THE ABDOMEN 5/15/2021 11:13 pm COMPARISON: None. HISTORY: ORDERING SYSTEM PROVIDED HISTORY: post OG placement TECHNOLOGIST PROVIDED HISTORY: Reason for exam:->post OG placement Reason for Exam: post OG placement Acuity: Acute Type of Exam: Initial FINDINGS: Tip of the enteric tube is seen within the fundus of the stomach. The side hole is noted within the fundus of the stomach. There is no gross free air. Tip and side hole of the enteric tube are below the GE junction. CT HEAD WO CONTRAST    Result Date: 5/16/2021  EXAMINATION: CT OF THE HEAD WITHOUT CONTRAST  5/15/2021 11:49 pm TECHNIQUE: CT of the head was performed without the administration of intravenous contrast. Dose modulation, iterative reconstruction, and/or weight based adjustment of the mA/kV was utilized to reduce the radiation dose to as low as reasonably achievable. COMPARISON: None. HISTORY: ORDERING SYSTEM PROVIDED HISTORY: Unresponsive TECHNOLOGIST PROVIDED HISTORY: Reason for exam:->Unresponsive Has a \"code stroke\" or \"stroke alert\" been called? ->No Decision Support Exception - unselect if not a suspected or confirmed emergency medical condition->Emergency Medical Condition (MA) Reason for Exam: unresponsive, alcohol intoxication FINDINGS: BRAIN/VENTRICLES: There is no acute intracranial hemorrhage, mass effect or midline shift. No abnormal extra-axial fluid collection. The gray-white differentiation is maintained without evidence of an acute infarct. There is no evidence of hydrocephalus. ORBITS: The visualized portion of the orbits demonstrate no acute abnormality. SINUSES: The visualized paranasal sinuses and mastoid air cells demonstrate no acute abnormality. SOFT TISSUES/SKULL:  No acute abnormality of the visualized skull or soft tissues. No acute intracranial abnormality. CT CERVICAL SPINE WO CONTRAST    Result Date: 5/16/2021  EXAMINATION: CT OF THE CERVICAL SPINE WITHOUT CONTRAST 5/15/2021 11:49 pm TECHNIQUE: CT of the cervical spine was performed without the administration of intravenous contrast. Multiplanar reformatted images are provided for review. Dose modulation, iterative reconstruction, and/or weight based adjustment of the mA/kV was utilized to reduce the radiation dose to as low as reasonably achievable. COMPARISON: None. HISTORY: ORDERING SYSTEM PROVIDED HISTORY: Unresponsive, fall TECHNOLOGIST PROVIDED HISTORY: Reason for exam:->Unresponsive, fall Decision Support Exception - unselect if not a suspected or confirmed emergency medical condition->Emergency Medical Condition (MA) Reason for Exam: unresponsive, fall? found down FINDINGS: BONES/ALIGNMENT: There is no acute fracture or traumatic malalignment. DEGENERATIVE CHANGES: No significant degenerative changes. SOFT TISSUES: There is no prevertebral soft tissue swelling. No acute abnormality of the cervical spine.      XR CHEST PORTABLE    Result Date: 5/16/2021  EXAMINATION: ONE X-RAY VIEW OF THE CHEST 5/15/2021 11:13 pm COMPARISON: None HISTORY: ORDERING SYSTEM PROVIDED HISTORY:  Post intubation TECHNOLOGIST PROVIDED HISTORY: Reason for exam:  Post intubation Reason for Exam:  Post intubation Acuity:  Acute Type of Exam: Initial FINDINGS: Tip of the ET tube is 5.8 cm above the renea. The enteric tube extends below the diaphragm. The cardiomediastinal silhouette is within normal limits. There is no focal consolidation, pleural effusion, or pneumothorax. There is no evidence of edema. 1. Tip of the ET tube is 5.8 cm above the renea. 2. No significant findings within the lungs. XR CHEST PORTABLE    Result Date: 5/16/2021  EXAMINATION: ONE XRAY VIEW OF THE CHEST 5/16/2021 6:38 am COMPARISON: Chest radiograph 05/15/2021 HISTORY: ORDERING SYSTEM PROVIDED HISTORY: ventilator TECHNOLOGIST PROVIDED HISTORY: Reason for exam:->ventilator Reason for Exam: ventilator Acuity: Unknown Type of Exam: Unknown FINDINGS: Unchanged endotracheal tube and gastric tube. Overlying heart monitor leads and tubing. Relatively low volumes. Clear lungs. No definite findings of pneumothorax or pleural effusion. Normal mediastinal and cardiac contours. No acute cardiopulmonary process.      Electronically signed by Glenroy Norwood MD on 5/17/2021 at 2:42 PM

## 2021-05-17 NOTE — PROGRESS NOTES
Pt showing signs of seizure activity, called hospitalist who consulted neuro, Kwesi Velasquez. After phone conversation of recent activity order for loading dose of keppra placed and transfer pt out for continuous EEG monitoring. While waiting on loading dose of keppra pt began seizing again, called hospitalist again awaiting return call for further orders.

## 2021-12-07 ENCOUNTER — HOSPITAL ENCOUNTER (EMERGENCY)
Age: 21
Discharge: HOME | End: 2021-12-07
Payer: MEDICAID

## 2021-12-07 VITALS
DIASTOLIC BLOOD PRESSURE: 73 MMHG | SYSTOLIC BLOOD PRESSURE: 133 MMHG | TEMPERATURE: 97.9 F | WEIGHT: 235 LBS | OXYGEN SATURATION: 96 % | HEART RATE: 93 BPM | HEIGHT: 67 IN | RESPIRATION RATE: 18 BRPM | BODY MASS INDEX: 36.88 KG/M2

## 2021-12-07 DIAGNOSIS — S61.219A LACERATION OF FINGER OF LEFT HAND WITHOUT FOREIGN BODY WITHOUT DAMAGE TO NAIL, UNSPECIFIED FINGER, INITIAL ENCOUNTER: Primary | ICD-10-CM

## 2021-12-07 PROCEDURE — 6360000002 HC RX W HCPCS: Performed by: PHYSICIAN ASSISTANT

## 2021-12-07 PROCEDURE — 90471 IMMUNIZATION ADMIN: CPT | Performed by: PHYSICIAN ASSISTANT

## 2021-12-07 PROCEDURE — 99284 EMERGENCY DEPT VISIT MOD MDM: CPT

## 2021-12-07 PROCEDURE — 6370000000 HC RX 637 (ALT 250 FOR IP): Performed by: PHYSICIAN ASSISTANT

## 2021-12-07 PROCEDURE — 12001 RPR S/N/AX/GEN/TRNK 2.5CM/<: CPT

## 2021-12-07 PROCEDURE — 90715 TDAP VACCINE 7 YRS/> IM: CPT | Performed by: PHYSICIAN ASSISTANT

## 2021-12-07 RX ORDER — LIDOCAINE HYDROCHLORIDE 20 MG/ML
10 INJECTION, SOLUTION INFILTRATION; PERINEURAL ONCE
Status: DISCONTINUED | OUTPATIENT
Start: 2021-12-07 | End: 2021-12-07 | Stop reason: HOSPADM

## 2021-12-07 RX ORDER — HYDROCODONE BITARTRATE AND ACETAMINOPHEN 5; 325 MG/1; MG/1
1 TABLET ORAL ONCE
Status: COMPLETED | OUTPATIENT
Start: 2021-12-07 | End: 2021-12-07

## 2021-12-07 RX ADMIN — HYDROCODONE BITARTRATE AND ACETAMINOPHEN 1 TABLET: 5; 325 TABLET ORAL at 05:09

## 2021-12-07 RX ADMIN — TETANUS TOXOID, REDUCED DIPHTHERIA TOXOID AND ACELLULAR PERTUSSIS VACCINE, ADSORBED 0.5 ML: 5; 2.5; 8; 8; 2.5 SUSPENSION INTRAMUSCULAR at 03:30

## 2021-12-07 ASSESSMENT — PAIN DESCRIPTION - LOCATION: LOCATION: HAND;FINGER (COMMENT WHICH ONE)

## 2021-12-07 ASSESSMENT — PAIN DESCRIPTION - PAIN TYPE: TYPE: ACUTE PAIN

## 2021-12-07 ASSESSMENT — PAIN SCALES - GENERAL
PAINLEVEL_OUTOF10: 4
PAINLEVEL_OUTOF10: 4

## 2021-12-07 ASSESSMENT — PAIN DESCRIPTION - ORIENTATION: ORIENTATION: LEFT

## 2021-12-07 NOTE — ED PROVIDER NOTES
Triage Chief Complaint:   Laceration (L pinky finger)    [place]:  Manuel Gutierrez is a 24 y.o. male that presents today for laceration. Context is cut finger on sharp knife just prior to arrival accidentally    Onset was just prior to arrival  No gross blood loss. No loss of consciousness no confusion or dizziness no lightheadedness no headache. ROS:  At least  06 systems reviewed and otherwise negative except as in the [address] Past Medical History:   Diagnosis Date    Asthma      History reviewed. No pertinent surgical history. History reviewed. No pertinent family history. Social History     Socioeconomic History    Marital status: Single     Spouse name: Not on file    Number of children: Not on file    Years of education: Not on file    Highest education level: Not on file   Occupational History    Not on file   Tobacco Use    Smoking status: Unknown If Ever Smoked    Smokeless tobacco: Not on file   Vaping Use    Vaping Use: Unknown   Substance and Sexual Activity    Alcohol use: Yes    Drug use: Yes     Types: Marijuana Charmayne Stai)    Sexual activity: Yes     Partners: Female   Other Topics Concern    Not on file   Social History Narrative    Not on file     Social Determinants of Health     Financial Resource Strain:     Difficulty of Paying Living Expenses: Not on file   Food Insecurity:     Worried About Running Out of Food in the Last Year: Not on file    David of Food in the Last Year: Not on file   Transportation Needs:     Lack of Transportation (Medical): Not on file    Lack of Transportation (Non-Medical):  Not on file   Physical Activity:     Days of Exercise per Week: Not on file    Minutes of Exercise per Session: Not on file   Stress:     Feeling of Stress : Not on file   Social Connections:     Frequency of Communication with Friends and Family: Not on file    Frequency of Social Gatherings with Friends and Family: Not on file    Attends Restorationism Services: Not on file  Active Member of Clubs or Organizations: Not on file    Attends Club or Organization Meetings: Not on file    Marital Status: Not on file   Intimate Partner Violence:     Fear of Current or Ex-Partner: Not on file    Emotionally Abused: Not on file    Physically Abused: Not on file    Sexually Abused: Not on file   Housing Stability:     Unable to Pay for Housing in the Last Year: Not on file    Number of [place] in the Last Year: Not on file    Unstable Housing in the Last Year: Not on file     No current facility-administered medications for this encounter. Current Outpatient Medications   Medication Sig Dispense Refill    naproxen (NAPROSYN) 500 MG tablet Take 1 tablet by mouth 2 times daily 60 tablet 0    hydrocortisone 2.5 % cream Apply topically 2 times daily. 1 Tube 2    predniSONE (DELTASONE) 10 MG tablet 5 tablets PO Qday X 3, then 4 tablets PO Qday X 3, then 3 tablets PO Qday X 3, then 2 tablets PO Qday X 3, then 1 tablets PO Qday X 3, then stop. 45 tablet 0     No Known Allergies    Nursing Notes Reviewed    Physical Exam:  ED Triage Vitals [12/07/21 0225]   Enc Vitals Group      /73      Pulse 93      Resp 18      Temp 97.9 °F (36.6 °C)      Temp Source Oral      SpO2 96 %      Weight 235 lb (106.6 kg)      Height 5' 7\" (1.702 m)      Head Circumference       Peak Flow       Pain Score       Pain Loc       Pain Edu? Excl. in [address]? GENERAL APPEARANCE: Awake and alert. Cooperative. No acute distress. HEAD: Normocephalic. Atraumatic. EYES: EOM's grossly intact. Sclera anicteric. PERRLA. Conjunctiva non injected. No discharge  ENT: Mucous membranes are moist. No trismus. Posterior Pharynx non injected, no tongue swelling, airway patent, no tonsillar edema. No exudates noted. No abscess. No discharge. Middle ear spaces clear. Tympanic membrane non injected. Auditory canal clear. ABDOMEN: Soft. Non-tender. No guarding or rebound. No organomegaly.  No palpable streaking, discharge, or any other worsening or worrisome concerns. I'm very pleased with the results of the wound repair. Pt is to be discharged home. Pt is  to return immediately to the emergency department if he has any new, worrisome or worsening symptoms. Pt is to follow up with PCP/DOD within 2 days. Patient/Surrogate vocalizes agreement and understanding with this plan and he has no questions upon disposition. Pt is comfortable upon disposition home. Patient is stable, Patients vital signs are stable. Vital signs and nursing notes reviewed during ED course. I independently managed patient today in the ED. All pertinent Lab data and radiographic results reviewed with patient at bedside. The patient and/or the family were informed of the results of any tests/labs/imaging, the treatment plan, and time was allotted to answer questions. See chart for details of medications given during the ED stay. /73   Pulse 93   Temp 97.9 °F (36.6 °C) (Oral)   Resp 18   Ht 5' 7\" (1.702 m)   Wt 235 lb (106.6 kg)   SpO2 96%   BMI 36.81 kg/m²       Clinical Impression:  1.  Laceration of finger of left hand without foreign body without damage to nail, unspecified finger, initial encounter        Disposition referral (if applicable):  [facility] [address] [place], [place] [phone]  Schedule an appointment as soon as possible for a visit in 1 day  For recheck as soon as possible    [facility] Emergency Department  [facility] [phone]  [phone]    If symptoms worsen or persist    Disposition medications (if applicable):  Discharge Medication List as of 12/7/2021  5:44 AM            Comment: Please note this report has been produced using speech recognition software and may contain errors related to that system including errors in grammar, punctuation, and spelling, as well as words and

## 2023-06-16 ENCOUNTER — APPOINTMENT (OUTPATIENT)
Dept: GENERAL RADIOLOGY | Age: 23
End: 2023-06-16
Payer: MEDICAID

## 2023-06-16 ENCOUNTER — HOSPITAL ENCOUNTER (EMERGENCY)
Age: 23
Discharge: ANOTHER HOSPITAL | End: 2023-06-16
Attending: EMERGENCY MEDICINE
Payer: MEDICAID

## 2023-06-16 VITALS
SYSTOLIC BLOOD PRESSURE: 134 MMHG | TEMPERATURE: 97.7 F | RESPIRATION RATE: 16 BRPM | HEART RATE: 81 BPM | BODY MASS INDEX: 39.16 KG/M2 | WEIGHT: 250 LBS | DIASTOLIC BLOOD PRESSURE: 80 MMHG | OXYGEN SATURATION: 99 %

## 2023-06-16 DIAGNOSIS — S55.012A LACERATION OF LEFT ULNAR ARTERY, INITIAL ENCOUNTER: Primary | ICD-10-CM

## 2023-06-16 LAB
ALBUMIN SERPL-MCNC: 3.3 GM/DL (ref 3.4–5)
ALP BLD-CCNC: 56 IU/L (ref 40–129)
ALT SERPL-CCNC: 9 U/L (ref 10–40)
ANION GAP SERPL CALCULATED.3IONS-SCNC: 10 MMOL/L (ref 4–16)
AST SERPL-CCNC: 12 IU/L (ref 15–37)
BASOPHILS ABSOLUTE: 0 K/CU MM
BASOPHILS RELATIVE PERCENT: 0.4 % (ref 0–1)
BILIRUB SERPL-MCNC: 0.4 MG/DL (ref 0–1)
BUN SERPL-MCNC: 17 MG/DL (ref 6–23)
CALCIUM SERPL-MCNC: 6.3 MG/DL (ref 8.3–10.6)
CHLORIDE BLD-SCNC: 118 MMOL/L (ref 99–110)
CO2: 17 MMOL/L (ref 21–32)
CREAT SERPL-MCNC: 0.8 MG/DL (ref 0.9–1.3)
DIFFERENTIAL TYPE: ABNORMAL
EOSINOPHILS ABSOLUTE: 0.1 K/CU MM
EOSINOPHILS RELATIVE PERCENT: 0.9 % (ref 0–3)
GFR SERPL CREATININE-BSD FRML MDRD: >60 ML/MIN/1.73M2
GLUCOSE SERPL-MCNC: 96 MG/DL (ref 70–99)
HCT VFR BLD CALC: 43.7 % (ref 42–52)
HEMOGLOBIN: 14.9 GM/DL (ref 13.5–18)
IMMATURE NEUTROPHIL %: 0.6 % (ref 0–0.43)
INR BLD: 0.98 INDEX
LYMPHOCYTES ABSOLUTE: 3.8 K/CU MM
LYMPHOCYTES RELATIVE PERCENT: 45 % (ref 24–44)
MCH RBC QN AUTO: 30 PG (ref 27–31)
MCHC RBC AUTO-ENTMCNC: 34.1 % (ref 32–36)
MCV RBC AUTO: 87.9 FL (ref 78–100)
MONOCYTES ABSOLUTE: 1 K/CU MM
MONOCYTES RELATIVE PERCENT: 11.1 % (ref 0–4)
NUCLEATED RBC %: 0 %
PDW BLD-RTO: 12 % (ref 11.7–14.9)
PLATELET # BLD: 230 K/CU MM (ref 140–440)
PMV BLD AUTO: 11.7 FL (ref 7.5–11.1)
POTASSIUM SERPL-SCNC: 2.7 MMOL/L (ref 3.5–5.1)
PROTHROMBIN TIME: 12.5 SECONDS (ref 11.7–14.5)
RBC # BLD: 4.97 M/CU MM (ref 4.6–6.2)
SEGMENTED NEUTROPHILS ABSOLUTE COUNT: 3.6 K/CU MM
SEGMENTED NEUTROPHILS RELATIVE PERCENT: 42 % (ref 36–66)
SODIUM BLD-SCNC: 145 MMOL/L (ref 135–145)
TOTAL IMMATURE NEUTOROPHIL: 0.05 K/CU MM
TOTAL NUCLEATED RBC: 0 K/CU MM
TOTAL PROTEIN: 4.7 GM/DL (ref 6.4–8.2)
WBC # BLD: 8.5 K/CU MM (ref 4–10.5)

## 2023-06-16 PROCEDURE — 96365 THER/PROPH/DIAG IV INF INIT: CPT

## 2023-06-16 PROCEDURE — 36430 TRANSFUSION BLD/BLD COMPNT: CPT

## 2023-06-16 PROCEDURE — P9016 RBC LEUKOCYTES REDUCED: HCPCS

## 2023-06-16 PROCEDURE — 6360000002 HC RX W HCPCS: Performed by: PHYSICIAN ASSISTANT

## 2023-06-16 PROCEDURE — 99285 EMERGENCY DEPT VISIT HI MDM: CPT

## 2023-06-16 PROCEDURE — 80053 COMPREHEN METABOLIC PANEL: CPT

## 2023-06-16 PROCEDURE — 6360000002 HC RX W HCPCS

## 2023-06-16 PROCEDURE — 86900 BLOOD TYPING SEROLOGIC ABO: CPT

## 2023-06-16 PROCEDURE — 85610 PROTHROMBIN TIME: CPT

## 2023-06-16 PROCEDURE — 90471 IMMUNIZATION ADMIN: CPT | Performed by: EMERGENCY MEDICINE

## 2023-06-16 PROCEDURE — 85025 COMPLETE CBC W/AUTO DIFF WBC: CPT

## 2023-06-16 PROCEDURE — 2580000003 HC RX 258: Performed by: EMERGENCY MEDICINE

## 2023-06-16 PROCEDURE — 73090 X-RAY EXAM OF FOREARM: CPT

## 2023-06-16 PROCEDURE — 86850 RBC ANTIBODY SCREEN: CPT

## 2023-06-16 PROCEDURE — 96375 TX/PRO/DX INJ NEW DRUG ADDON: CPT

## 2023-06-16 PROCEDURE — 90715 TDAP VACCINE 7 YRS/> IM: CPT | Performed by: EMERGENCY MEDICINE

## 2023-06-16 PROCEDURE — 6360000002 HC RX W HCPCS: Performed by: EMERGENCY MEDICINE

## 2023-06-16 PROCEDURE — 86901 BLOOD TYPING SEROLOGIC RH(D): CPT

## 2023-06-16 PROCEDURE — 2500000003 HC RX 250 WO HCPCS: Performed by: EMERGENCY MEDICINE

## 2023-06-16 RX ORDER — 0.9 % SODIUM CHLORIDE 0.9 %
1000 INTRAVENOUS SOLUTION INTRAVENOUS ONCE
Status: COMPLETED | OUTPATIENT
Start: 2023-06-16 | End: 2023-06-16

## 2023-06-16 RX ORDER — FENTANYL CITRATE 50 UG/ML
25 INJECTION, SOLUTION INTRAMUSCULAR; INTRAVENOUS ONCE
Status: COMPLETED | OUTPATIENT
Start: 2023-06-16 | End: 2023-06-16

## 2023-06-16 RX ORDER — FENTANYL CITRATE 50 UG/ML
INJECTION, SOLUTION INTRAMUSCULAR; INTRAVENOUS
Status: COMPLETED
Start: 2023-06-16 | End: 2023-06-16

## 2023-06-16 RX ORDER — FENTANYL CITRATE 50 UG/ML
50 INJECTION, SOLUTION INTRAMUSCULAR; INTRAVENOUS ONCE
Status: COMPLETED | OUTPATIENT
Start: 2023-06-16 | End: 2023-06-16

## 2023-06-16 RX ORDER — SODIUM CHLORIDE 9 MG/ML
INJECTION, SOLUTION INTRAVENOUS PRN
Status: DISCONTINUED | OUTPATIENT
Start: 2023-06-16 | End: 2023-06-16 | Stop reason: HOSPADM

## 2023-06-16 RX ORDER — TRANEXAMIC ACID 10 MG/ML
1000 INJECTION, SOLUTION INTRAVENOUS ONCE
Status: COMPLETED | OUTPATIENT
Start: 2023-06-16 | End: 2023-06-16

## 2023-06-16 RX ORDER — ONDANSETRON 2 MG/ML
4 INJECTION INTRAMUSCULAR; INTRAVENOUS ONCE
Status: COMPLETED | OUTPATIENT
Start: 2023-06-16 | End: 2023-06-16

## 2023-06-16 RX ORDER — ONDANSETRON 2 MG/ML
INJECTION INTRAMUSCULAR; INTRAVENOUS
Status: DISCONTINUED
Start: 2023-06-16 | End: 2023-06-16 | Stop reason: HOSPADM

## 2023-06-16 RX ORDER — FENTANYL CITRATE 50 UG/ML
INJECTION, SOLUTION INTRAMUSCULAR; INTRAVENOUS
Status: DISCONTINUED
Start: 2023-06-16 | End: 2023-06-16 | Stop reason: HOSPADM

## 2023-06-16 RX ADMIN — TRANEXAMIC ACID 1000 MG: 10 INJECTION, SOLUTION INTRAVENOUS at 11:49

## 2023-06-16 RX ADMIN — TETANUS TOXOID, REDUCED DIPHTHERIA TOXOID AND ACELLULAR PERTUSSIS VACCINE, ADSORBED 0.5 ML: 5; 2.5; 8; 8; 2.5 SUSPENSION INTRAMUSCULAR at 12:07

## 2023-06-16 RX ADMIN — FENTANYL CITRATE 50 MCG: 50 INJECTION, SOLUTION INTRAMUSCULAR; INTRAVENOUS at 11:46

## 2023-06-16 RX ADMIN — ONDANSETRON 4 MG: 2 INJECTION INTRAMUSCULAR; INTRAVENOUS at 12:01

## 2023-06-16 RX ADMIN — FENTANYL CITRATE 25 MCG: 50 INJECTION, SOLUTION INTRAMUSCULAR; INTRAVENOUS at 12:07

## 2023-06-16 RX ADMIN — SODIUM CHLORIDE 1000 ML: 9 INJECTION, SOLUTION INTRAVENOUS at 12:09

## 2023-06-16 RX ADMIN — FENTANYL CITRATE 50 MCG: 50 INJECTION INTRAMUSCULAR; INTRAVENOUS at 11:46

## 2023-06-16 RX ADMIN — CEFAZOLIN 2000 MG: 2 INJECTION, POWDER, FOR SOLUTION INTRAMUSCULAR; INTRAVENOUS at 12:01

## 2023-06-16 ASSESSMENT — PAIN SCALES - GENERAL: PAINLEVEL_OUTOF10: 9

## 2023-06-16 NOTE — ACP (ADVANCE CARE PLANNING)
Patient does not have any ACP documents/Medical Power of . LSW notes hospital will follow [place]'s Next of Kin hierarchy in the following descending order for priority:    Guardian  Spouse  [de-identified] of adult Children  Parents  [de-identified] of adult Siblings  Nearest Relative not described above    Per [place] Next of Kin hierarchy: Patients' parent will be [address].

## 2023-06-16 NOTE — ED PROVIDER NOTES
I personally saw Altagracia Morning and performed a substantive portion of the visit including all aspects of the medical decision making. In brief, 60-year-old male who presents with left upper extremity injury. 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 Describes that he put his hand through part of the floor to catch himself. EMS reports a large amount of blood on scene. Focused exam revealed the patient appears pale, mildly diaphoretic. Well-nourished. Speech is clear. Breathing is unlabored. Skin is dry. Mental status is normal.     ED course: 2 large-bore IVs placed on arrival.  Patient does have a tourniquet on left upper extremity - in place since 1115 per EMS. When the dressing from the distal forearm taken down, patient continued to have arterial bleeding, appears to be from ulnar artery. Radial artery is palpable. Visible tendon injury per DORI Mcdaniel. Point pressure applied while second tourniquet applied. Good hemostasis achieved with this tourniquet. Dressing reapplied. Patient has been given Ancef as well as TXA, tetanus and pain medication. Care flight activated by EMS in the field. Report given to Dr. Anirudh Santos who has accepted the patient to [place].       Final Impression:  1. Laceration of left ulnar artery, initial encounter      DISPOSITION Decision To Transfer 06/16/2023 11:53:41 AM      All diagnostic, treatment, and disposition decisions were made by myself in conjunction with the advanced practice provider. For all further details of the patient's emergency department visit, please see the advanced practice provider's documentation. Comment: Please note this report has been produced using speech recognition software and may contain errors related to that system including errors in grammar, punctuation, and spelling, as well as words and phrases that may be inappropriate.  If there are any questions or concerns please feel free to

## 2023-06-16 NOTE — ED PROVIDER NOTES
Emergency Department Encounter    Patient: Izaiah Roque  MRN: [id number]  : 2000  Date of Evaluation: 2023  ED Provider:  Marcelino Webb PA-C    Triage Chief Complaint:   No chief complaint on file. [place]:  Izaiah Roque is a 25 y.o. male that presents via EMS squad for a left wrist arterial bleed that occurred approximately 30 minutes prior to arrival.  EMS crew reports that patient had fallen through the floor of a barn and lacerated his wrist on either broken wood or glass, fell approximately 3 feet no head trauma, was able to sit down on scene as well as ambulate on scene. Squad put a tourniquet in place at 1115 hrs. local.  There is considerable amount of blood on scene however they are unable to quantify. Reports stable vital signs on transport over. Patient reports he has pain secondary to tourniquet as well at the laceration of his wrist.  Denies any loss of consciousness recalls the fall. Reports he landed on his feet was able to sit on his bottom without issue. Denies any dizziness. Altered mental status. Has not eaten since last evening. Unsure of his last tetanus status. No allergies to medications. No medications on a daily basis. ROS - see HPI, below listed is current ROS at time of my eval:  General:  no weakness  Eyes:  No recent vison changes  ENT:  No sore throat, no nasal congestion, no hearing changes  Cardiovascular:  No chest pain  Respiratory:  No shortness of breath  Gastrointestinal:  No pain, no nausea, no vomiting  Musculoskeletal:  No muscle pain, no joint pain  Skin:  No rash, pos wound left wrist  Neurologic:  No speech problems, no headache, no extremity numbness, no extremity tingling, no extremity weakness  Genitourinary: no hematuria  Extremities:  no edema, no pain    Past Medical History:   Diagnosis Date    Asthma      No past surgical history on file. No family history on file.   Social History     Socioeconomic History    Marital status: Single

## 2023-06-16 NOTE — ED NOTES
K 2.7 and Calcium 6.3 [facility] notified     Joann Doty, [address] [facility]  06/16/23 6627

## 2023-06-17 LAB
COMPONENT: NORMAL
CROSSMATCH RESULT: NORMAL
STATUS: NORMAL
TRANSFUSION STATUS: NORMAL
UNIT DIVISION: 0
UNIT NUMBER: NORMAL